# Patient Record
Sex: FEMALE | Race: BLACK OR AFRICAN AMERICAN | Employment: FULL TIME | URBAN - METROPOLITAN AREA
[De-identification: names, ages, dates, MRNs, and addresses within clinical notes are randomized per-mention and may not be internally consistent; named-entity substitution may affect disease eponyms.]

---

## 2020-04-04 ENCOUNTER — HOSPITAL ENCOUNTER (EMERGENCY)
Age: 25
Discharge: HOME | End: 2020-04-04
Payer: MEDICAID

## 2020-04-04 VITALS
TEMPERATURE: 98.4 F | DIASTOLIC BLOOD PRESSURE: 62 MMHG | SYSTOLIC BLOOD PRESSURE: 129 MMHG | BODY MASS INDEX: 19.29 KG/M2 | OXYGEN SATURATION: 99 % | WEIGHT: 120 LBS | HEART RATE: 100 BPM | HEIGHT: 66 IN | RESPIRATION RATE: 17 BRPM

## 2020-04-04 DIAGNOSIS — J45.20 MILD INTERMITTENT EXTRINSIC ASTHMA WITHOUT COMPLICATION: Primary | ICD-10-CM

## 2020-04-04 PROCEDURE — 99282 EMERGENCY DEPT VISIT SF MDM: CPT

## 2020-04-04 RX ORDER — ALBUTEROL SULFATE 90 UG/1
2 AEROSOL, METERED RESPIRATORY (INHALATION)
Qty: 2 INHALER | Refills: 1 | Status: SHIPPED | OUTPATIENT
Start: 2020-04-04

## 2020-04-04 RX ORDER — PREDNISONE 50 MG/1
50 TABLET ORAL DAILY
Qty: 5 TAB | Refills: 0 | Status: SHIPPED | OUTPATIENT
Start: 2020-04-04 | End: 2020-04-09

## 2020-04-04 NOTE — DISCHARGE INSTRUCTIONS
Patient Education        Learning About Asthma Triggers  What are asthma triggers? When you have asthma, certain things can make your symptoms worse. These are called triggers. Learn what triggers an asthma attack for you, and avoid the triggers when you can. Common triggers include colds, smoke, air pollution, dust, pollen, pets, stress, and cold air. How do asthma triggers affect you? Triggers can make it harder for your lungs to work as they should. They can lead to sudden breathing problems and other symptoms. When you are around a trigger, an asthma attack is more likely. If your symptoms are severe, you may need emergency treatment or have to go to the hospital for treatment. What can you do to avoid triggers? The first thing is to know your triggers. When you are having symptoms, note the things around you that might be causing them. Then look for patterns that may be triggering your symptoms. Record your triggers on a piece of paper or in an asthma diary. When you have your list of possible triggers, work with your doctor to find ways to avoid them. Avoid colds and flu. Get a pneumococcal vaccine shot. If you have had one before, ask your doctor whether you need a second dose. Get a flu vaccine every year, as soon as it's available. If you must be around people with colds or the flu, wash your hands often. Here are some ways to avoid a few common triggers. · Do not smoke or allow others to smoke around you. If you need help quitting, talk to your doctor about stop-smoking programs and medicines. These can increase your chances of quitting for good. · If there is a lot of pollution, pollen, or dust outside, stay at home and keep your windows closed. Use an air conditioner or air filter in your home. Check your local weather report or newspaper for air quality and pollen reports. What else should you know? · Take your controller medicine every day, not just when you have symptoms.  It helps prevent problems before they occur. · Your doctor may suggest that you check how well your lungs are working by measuring your peak expiratory flow (PEF) throughout the day. Your PEF may drop when you are near things that trigger symptoms. Where can you learn more? Go to http://jaz-josette.info/  Enter S900 in the search box to learn more about \"Learning About Asthma Triggers. \"  Current as of: June 9, 2019Content Version: 12.4  © 1684-5296 [employer]. Care instructions adapted under license by [employer] (which disclaims liability or warranty for this information). If you have questions about a medical condition or this instruction, always ask your healthcare professional. [address] any warranty or liability for your use of this information.

## 2020-04-04 NOTE — ED NOTES
I have reviewed discharge instructions with the patient. The patient verbalized understanding. Patient left ED via Discharge Method: ambulatory to Home with self. Opportunity for questions and clarification provided. Patient given 2 scripts. To continue your aftercare when you leave the hospital, you may receive an automated call from our care team to check in on how you are doing. This is a free service and part of our promise to provide the best care and service to meet your aftercare needs.  If you have questions, or wish to unsubscribe from this service please call [phone]. Thank you for Choosing our [facility] Emergency Department.

## 2020-04-04 NOTE — ED TRIAGE NOTES
Pt c/o seasonal allergies and asthma. Pt states she is out of her albuterol inhaler and needs a refill.

## 2020-04-04 NOTE — ED PROVIDER NOTES
History of allergies and asthma patient does not have her inhalers she recently moved and can either find them or does not have them. She been wheezing for last few days. Allergic Reaction    This is a recurrent problem. The current episode started 2 days ago. The problem has not changed since onset. Past Medical History:   Diagnosis Date    Asthma        Past Surgical History:   Procedure Laterality Date    HX TONSILLECTOMY           History reviewed. No pertinent family history. Social History     Socioeconomic History    Marital status: SINGLE     Spouse name: Not on file    Number of children: Not on file    Years of education: Not on file    Highest education level: Not on file   Occupational History    Not on file   Social Needs    Financial resource strain: Not on file    Food insecurity     Worry: Not on file     Inability: Not on file    Transportation needs     Medical: Not on file     Non-medical: Not on file   Tobacco Use    Smoking status: Never Smoker   Substance and Sexual Activity    Alcohol use: Yes    Drug use: No    Sexual activity: Not on file   Lifestyle    Physical activity     Days per week: Not on file     Minutes per session: Not on file    Stress: Not on file   Relationships    Social connections     Talks on phone: Not on file     Gets together: Not on file     Attends [employer] service: Not on file     Active member of club or organization: Not on file     Attends meetings of clubs or organizations: Not on file     Relationship status: Not on file    Intimate partner violence     Fear of current or ex partner: Not on file     Emotionally abused: Not on file     Physically abused: Not on file     Forced sexual activity: Not on file   Other Topics Concern    Not on file   Social History Narrative    Not on file         ALLERGIES: Shellfish derived    Review of Systems   Constitutional: Negative. Negative for activity change. HENT: Negative.     Eyes: Negative. Respiratory: Negative. Cardiovascular: Negative. Gastrointestinal: Negative. Genitourinary: Negative. Musculoskeletal: Negative. Skin: Negative. Neurological: Negative. Psychiatric/Behavioral: Negative. All other systems reviewed and are negative. Vitals:    04/04/20 1226   BP: 129/62   Pulse: 100   Resp: 17   Temp: 98.4 °F (36.9 °C)   SpO2: 99%   Weight: 54.4 kg (120 lb)   Height: 5' 6\" (1.676 m)            Physical Exam  Vitals signs and nursing note reviewed. Constitutional:       General: She is not in acute distress. Appearance: She is well-developed. She is not diaphoretic. HENT:      Head: Normocephalic and atraumatic. Right Ear: External ear normal.      Left Ear: External ear normal.      Nose: Nose normal.      Mouth/Throat:      Pharynx: No oropharyngeal exudate. Eyes:      General: No scleral icterus. Right eye: No discharge. Left eye: No discharge. Conjunctiva/sclera: Conjunctivae normal.      Pupils: Pupils are equal, round, and reactive to light. Neck:      Musculoskeletal: Normal range of motion and neck supple. Vascular: No JVD. Trachea: No tracheal deviation. Cardiovascular:      Rate and Rhythm: Normal rate and regular rhythm. Pulmonary:      Effort: Pulmonary effort is normal. No respiratory distress. Breath sounds: Normal breath sounds. No stridor. No wheezing. Chest:      Chest wall: No tenderness. Abdominal:      General: Bowel sounds are normal. There is no distension. Palpations: Abdomen is soft. There is no mass. Tenderness: There is no abdominal tenderness. Musculoskeletal: Normal range of motion. General: No tenderness. Skin:     General: Skin is warm and dry. Coloration: Skin is not pale. Findings: No erythema or rash. Neurological:      Mental Status: She is alert and oriented to person, place, and time. Cranial Nerves: No cranial nerve deficit. Psychiatric:         Behavior: Behavior normal.         Thought Content: Thought content normal.          MDM  Number of Diagnoses or Management Options  Mild intermittent extrinsic asthma without complication:   Diagnosis management comments: Currently no wheezing or shortness of breath no exertional dyspnea no fever no chills no cough no cold no flulike symptoms afebrile no tachypnea tachycardia or hypoxia.          Procedures

## 2022-11-08 ENCOUNTER — HOSPITAL ENCOUNTER (EMERGENCY)
Age: 27
Discharge: HOME | End: 2022-11-08
Attending: EMERGENCY MEDICINE
Payer: MEDICAID

## 2022-11-08 VITALS
TEMPERATURE: 98.4 F | HEIGHT: 66 IN | DIASTOLIC BLOOD PRESSURE: 74 MMHG | RESPIRATION RATE: 18 BRPM | WEIGHT: 120 LBS | SYSTOLIC BLOOD PRESSURE: 113 MMHG | BODY MASS INDEX: 19.29 KG/M2 | OXYGEN SATURATION: 99 % | HEART RATE: 114 BPM

## 2022-11-08 DIAGNOSIS — J10.1 INFLUENZA A: Primary | ICD-10-CM

## 2022-11-08 DIAGNOSIS — Z3A.10 10 WEEKS GESTATION OF PREGNANCY: ICD-10-CM

## 2022-11-08 LAB
FLUAV AG NPH QL IA: POSITIVE
FLUBV AG NPH QL IA: NEGATIVE
SARS-COV-2 RDRP RESP QL NAA+PROBE: NOT DETECTED
SOURCE: NORMAL
SPECIMEN SOURCE: ABNORMAL

## 2022-11-08 PROCEDURE — 87804 INFLUENZA ASSAY W/OPTIC: CPT

## 2022-11-08 PROCEDURE — 87635 SARS-COV-2 COVID-19 AMP PRB: CPT

## 2022-11-08 PROCEDURE — 99283 EMERGENCY DEPT VISIT LOW MDM: CPT

## 2022-11-08 RX ORDER — ONDANSETRON 4 MG/1
4 TABLET, FILM COATED ORAL 3 TIMES DAILY PRN
Qty: 9 TABLET | Refills: 0 | Status: SHIPPED | OUTPATIENT
Start: 2022-11-08 | End: 2022-11-11

## 2022-11-08 RX ORDER — OSELTAMIVIR PHOSPHATE 75 MG/1
75 CAPSULE ORAL 2 TIMES DAILY
Qty: 10 CAPSULE | Refills: 0 | Status: SHIPPED | OUTPATIENT
Start: 2022-11-08 | End: 2022-11-13

## 2022-11-08 ASSESSMENT — PAIN - FUNCTIONAL ASSESSMENT: PAIN_FUNCTIONAL_ASSESSMENT: 0-10

## 2022-11-08 ASSESSMENT — ENCOUNTER SYMPTOMS
VOMITING: 1
ABDOMINAL PAIN: 0
NAUSEA: 1
SHORTNESS OF BREATH: 0

## 2022-11-08 ASSESSMENT — PAIN SCALES - GENERAL: PAINLEVEL_OUTOF10: 0

## 2022-11-08 NOTE — ED TRIAGE NOTES
Patient ambulatory to triage with c/o being 10 weeks pregnant and ever since she has been pregnant she feels nauseous, low energy and then last night states she had vomited a few times.

## 2022-11-08 NOTE — DISCHARGE INSTRUCTIONS
Your flu test was positive today. Use Tylenol as needed for fever or body aches at home. You can use Zofran as needed for nausea. Follow-up with your OB as planned. Follow-up with your PCP in 1 to 2 days if no improvement. Return to the ER for any new or worsening symptoms.

## 2022-11-08 NOTE — ED NOTES
I have reviewed discharge instructions with the patient. The patient verbalized understanding. Patient left ED via Discharge Method: ambulatory to Home with family. Opportunity for questions and clarification provided. Patient given 2 scripts. To continue your aftercare when you leave the hospital, you may receive an automated call from our care team to check in on how you are doing. This is a free service and part of our promise to provide the best care and service to meet your aftercare needs.  If you have questions, or wish to unsubscribe from this service please call [phone]. Thank you for Choosing our [facility] Emergency Department.       Frida Eastman RN  11/08/22 2116

## 2022-11-08 NOTE — ED PROVIDER NOTES
Emergency Department Provider Note                   PCP:                None Provider               Age: 32 y.o. Sex: female       ICD-10-CM    1. Influenza A  J10.1       2. 10 weeks gestation of pregnancy  Z3A.10           DISPOSITION Decision To Discharge 11/08/2022 03:38:13 PM        MDM  Number of Diagnoses or Management Options  10 weeks gestation of pregnancy  Influenza A  Diagnosis management comments:     Overall patient is a well-appearing 42-year-old female who presented to the emergency department today for complaint of nausea and vomiting, exposure to influenza. She is G2, P1 at 10 weeks gestation. She is overall nontoxic in appearance, her vital signs are stable. Physical exam is reassuring. She has no abdominal cramping or any vaginal bleeding. Her daughter tested positive for influenza after exposure at school and patient wanted to be tested as well. Patient is also positive for influenza A. We will treat symptomatically at home, discussed risks versus benefits of antiviral treatment with patient. She is unsure if she wants to try the treatment or not, she was provided with a prescription that she can fill if she decides to. I recommended that she call and discuss her concerns with her OB and she is in agreement with this plan. Understands that it is most useful to start this medication within 48 hours of symptoms. Return for any new or worsening symptoms.        Amount and/or Complexity of Data Reviewed  Clinical lab tests: ordered and reviewed  Tests in the medicine section of CPT®: ordered    Patient Progress  Patient progress: stable       ED Course as of 11/08/22 1553   Tue Nov 08, 2022   1538 Influenza A Ag(!): Positive [KE]      ED Course User Index  [KE] EVA Moss        Orders Placed This Encounter   Procedures    Rapid influenza A/B antigens    COVID-19, Rapid    POCT Urine Dipstick        Medications - No data to display    New Prescriptions    ONDANSETRON (ZOFRAN) 4 MG TABLET    Take 1 tablet by mouth 3 times daily as needed for Nausea or Vomiting    OSELTAMIVIR (TAMIFLU) 75 MG CAPSULE    Take 1 capsule by mouth 2 times daily for 5 days        Deandra Patton is a 32 y.o. female who presents to the Emergency Department with chief complaint of    Chief Complaint   Patient presents with    Emesis During Pregnancy      66-year-old female at 10 weeks gestation presents to the department today with her daughter. Her daughter has had congestion and cough, and has had several students in her class test positive for influenza A. Patient is concerned because last night she felt very fatigued, nauseous and had multiple episodes of nonbilious, nonbloody emesis. She denies having any fever, chills, body aches, cough or sore throat. She states that she was not sure if it was typical pregnancy symptoms, but admits that her pregnancy up to this point has been uncomplicated with almost no nausea, and her first pregnancy she did not have any issues with nausea either. She would like to be tested for influenza as well. She denies any abdominal cramping or any vaginal bleeding. No other acute complaints or concerns today. Has not yet had her first OB prenatal appointment to confirm pregnancy. The history is provided by the patient. No  was used. Review of Systems   Constitutional:  Negative for activity change, chills and fever. HENT:  Negative for congestion. Eyes:  Negative for visual disturbance. Respiratory:  Negative for shortness of breath. Cardiovascular:  Negative for chest pain. Gastrointestinal:  Positive for nausea and vomiting. Negative for abdominal pain. Genitourinary:  Negative for dysuria. Musculoskeletal:  Negative for myalgias. Skin:  Negative for rash. Neurological:  Negative for dizziness and headaches.      Past Medical History:   Diagnosis Date    Asthma         Past Surgical History:   Procedure Laterality Date    TONSILLECTOMY          History reviewed. No pertinent family history. Social History     Socioeconomic History    Marital status: Single     Spouse name: None    Number of children: None    Years of education: None    Highest education level: None   Tobacco Use    Smoking status: Never   Substance and Sexual Activity    Alcohol use: Yes    Drug use: No         Shellfish-derived products     Previous Medications    ALBUTEROL SULFATE  (90 BASE) MCG/ACT INHALER    Inhale 2 puffs into the lungs every 4 hours as needed        Vitals signs and nursing note reviewed. Patient Vitals for the past 4 hrs:   Temp Pulse Resp BP SpO2   11/08/22 1234 98.4 °F (36.9 °C) (!) 114 18 113/74 99 %          Physical Exam  Vitals and nursing note reviewed. Constitutional:       General: She is not in acute distress. Appearance: Normal appearance. HENT:      Head: Normocephalic and atraumatic. Right Ear: Tympanic membrane and ear canal normal.      Left Ear: Tympanic membrane and ear canal normal.      Nose: Nose normal.      Mouth/Throat:      Mouth: Mucous membranes are moist.      Pharynx: Oropharynx is clear. No oropharyngeal exudate. Eyes:      Extraocular Movements: Extraocular movements intact. Conjunctiva/sclera: Conjunctivae normal.      Pupils: Pupils are equal, round, and reactive to light. Cardiovascular:      Rate and Rhythm: Normal rate and regular rhythm. Heart sounds: No murmur heard. No friction rub. No gallop. Pulmonary:      Effort: Pulmonary effort is normal.      Breath sounds: No wheezing, rhonchi or rales. Abdominal:      Palpations: Abdomen is soft. Tenderness: There is no abdominal tenderness. Musculoskeletal:      Cervical back: Normal range of motion. Skin:     General: Skin is warm and dry. Capillary Refill: Capillary refill takes less than 2 seconds. Neurological:      General: No focal deficit present.       Mental Status: She is alert and oriented to person, place, and time. Sensory: No sensory deficit. Motor: No weakness. Gait: Gait normal.   Psychiatric:         Mood and Affect: Mood normal.         Thought Content: Thought content normal.         Judgment: Judgment normal.        Procedures    Results for orders placed or performed during the hospital encounter of 11/08/22   Rapid influenza A/B antigens    Specimen: Nasal Washing   Result Value Ref Range    Influenza A Ag Positive (A) NEG      Influenza B Ag Negative NEG      Source Nasopharyngeal     COVID-19, Rapid    Specimen: Nasopharyngeal   Result Value Ref Range    Source Nasopharyngeal      SARS-CoV-2, Rapid Not detected NOTD          No orders to display                       Voice dictation software was used during the making of this note. This software is not perfect and grammatical and other typographical errors may be present. This note has not been completely proofread for errors.        Jasper Haddad  11/08/22 1550